# Patient Record
Sex: FEMALE | URBAN - METROPOLITAN AREA
[De-identification: names, ages, dates, MRNs, and addresses within clinical notes are randomized per-mention and may not be internally consistent; named-entity substitution may affect disease eponyms.]

---

## 2020-06-25 ENCOUNTER — OFFICE VISIT (OUTPATIENT)
Dept: URBAN - METROPOLITAN AREA CLINIC 23 | Facility: CLINIC | Age: 60
End: 2020-06-25

## 2020-06-25 NOTE — HPI-TODAY'S VISIT:
yo    presenting for evaluation of reflux symptoms started around specific complaints include daytime/nighttime time for dinner and bedtime  worse with better with  food triggers difficulty swallowing cough/hoarseness medications used abdominal pain  appetite weight loss